# Patient Record
Sex: MALE | HISPANIC OR LATINO | Employment: OTHER | URBAN - METROPOLITAN AREA
[De-identification: names, ages, dates, MRNs, and addresses within clinical notes are randomized per-mention and may not be internally consistent; named-entity substitution may affect disease eponyms.]

---

## 2022-11-10 ENCOUNTER — HOSPITAL ENCOUNTER (EMERGENCY)
Facility: HOSPITAL | Age: 62
Discharge: HOME | End: 2022-11-10
Attending: EMERGENCY MEDICINE

## 2022-11-10 VITALS
DIASTOLIC BLOOD PRESSURE: 90 MMHG | RESPIRATION RATE: 18 BRPM | TEMPERATURE: 98 F | OXYGEN SATURATION: 95 % | SYSTOLIC BLOOD PRESSURE: 136 MMHG | HEART RATE: 90 BPM | WEIGHT: 200 LBS

## 2022-11-10 DIAGNOSIS — F12.929 MARIJUANA INTOXICATION (HCC): Primary | ICD-10-CM

## 2022-11-10 LAB
ATRIAL RATE: 109 BPM
P AXIS: 53 DEGREES
PR INTERVAL: 142 MS
QRS AXIS: 48 DEGREES
QRSD INTERVAL: 82 MS
QT INTERVAL: 348 MS
QTC INTERVAL: 468 MS
T WAVE AXIS: 46 DEGREES
VENTRICULAR RATE: 109 BPM

## 2022-12-16 NOTE — ED PROVIDER NOTES
History  Chief Complaint   Patient presents with   • Recreational Drug Use     Per Pt " I had one cannabis gummies and I am feeling unbalance, shakiness, heart racing and dry mouth  This is my first time use "      Elizabeth Barth is a 63 y o  male with a past medical history of anxiety presenting today after ingestion of cannabis Gummies  Patient with feelings of shakiness and heart racing with dry mouth  Reports that this is his first time using the substance  Denies any chest pain, shortness of breath, lightheadedness, dizziness, nausea, vomiting, diarrhea, fevers, chills, numbness, tingling, weakness in the extremities  No further complaints at this time            None       Past Medical History:   Diagnosis Date   • Anxiety        History reviewed  No pertinent surgical history  History reviewed  No pertinent family history  I have reviewed and agree with the history as documented  E-Cigarette/Vaping   • E-Cigarette Use Never User      E-Cigarette/Vaping Substances   • Nicotine No    • THC Yes    • CBD No      Social History     Tobacco Use   • Smoking status: Former   • Smokeless tobacco: Never   Vaping Use   • Vaping Use: Never used   Substance Use Topics   • Alcohol use: Never   • Drug use: Yes     Comment: cannabis gummies       Review of Systems   Constitutional: Negative for chills and fever  HENT: Negative for ear pain and sore throat  Eyes: Negative for pain and visual disturbance  Respiratory: Negative for cough and shortness of breath  Cardiovascular: Positive for palpitations  Negative for chest pain  Gastrointestinal: Negative for abdominal pain and vomiting  Genitourinary: Negative for dysuria and hematuria  Musculoskeletal: Negative for arthralgias and back pain  Skin: Negative for color change and rash  Neurological: Negative for seizures and syncope  All other systems reviewed and are negative        Physical Exam  Physical Exam  Vitals and nursing note reviewed  Constitutional:       General: He is not in acute distress  Appearance: He is well-developed  HENT:      Head: Normocephalic and atraumatic  Eyes:      Conjunctiva/sclera: Conjunctivae normal    Cardiovascular:      Rate and Rhythm: Normal rate and regular rhythm  Heart sounds: No murmur heard  Pulmonary:      Effort: Pulmonary effort is normal  No respiratory distress  Breath sounds: Normal breath sounds  Abdominal:      Palpations: Abdomen is soft  Tenderness: There is no abdominal tenderness  Musculoskeletal:         General: No swelling  Cervical back: Neck supple  Skin:     General: Skin is warm and dry  Capillary Refill: Capillary refill takes less than 2 seconds  Neurological:      Mental Status: He is alert  Psychiatric:         Mood and Affect: Mood normal          Vital Signs  ED Triage Vitals   Temperature Pulse Respirations Blood Pressure SpO2   11/10/22 0332 11/10/22 0332 11/10/22 0332 11/10/22 0336 11/10/22 0332   98 °F (36 7 °C) (!) 112 20 143/82 94 %      Temp Source Heart Rate Source Patient Position - Orthostatic VS BP Location FiO2 (%)   11/10/22 0332 11/10/22 0332 11/10/22 0558 11/10/22 0558 --   Oral Monitor Sitting Right arm       Pain Score       11/10/22 0332       No Pain           Vitals:    11/10/22 0332 11/10/22 0336 11/10/22 0558 11/10/22 0740   BP:  143/82 136/90    Pulse: (!) 112  94 90   Patient Position - Orthostatic VS:   Sitting          Visual Acuity      ED Medications  Medications - No data to display    Diagnostic Studies  Results Reviewed     None                 No orders to display              Procedures  Procedures         ED Course  ED Course as of 12/16/22 0018   Thu Nov 10, 2022   0244 Patient is alert and oriented x 4  Clinically sober on examination                                  SBIRT 20yo+    Flowsheet Row Most Recent Value   SBIRT (23 yo +)    In order to provide better care to our patients, we are screening all of our patients for alcohol and drug use  Would it be okay to ask you these screening questions? Yes Filed at: 11/10/2022 7035   Initial Alcohol Screen: US AUDIT-C     1  How often do you have a drink containing alcohol? 0 Filed at: 11/10/2022 0427   2  How many drinks containing alcohol do you have on a typical day you are drinking? 0 Filed at: 11/10/2022 0427   3a  Male UNDER 65: How often do you have five or more drinks on one occasion? 0 Filed at: 11/10/2022 0427   3b  FEMALE Any Age, or MALE 65+: How often do you have 4 or more drinks on one occassion? 0 Filed at: 11/10/2022 0427   Audit-C Score 0 Filed at: 11/10/2022 0655   MERRILL: How many times in the past year have you    Used an illegal drug or used a prescription medication for non-medical reasons? Never Filed at: 11/10/2022 6405                    MDM  Number of Diagnoses or Management Options  Marijuana intoxication [facility]): new and requires workup  Diagnosis management comments: Patient feeling significantly improved upon discharge  Amount and/or Complexity of Data Reviewed  Clinical lab tests: ordered and reviewed  Review and summarize past medical records: yes    Risk of Complications, Morbidity, and/or Mortality  Presenting problems: low  Diagnostic procedures: low  Management options: low  General comments: Patient feeling significantly improved     Patient Progress  Patient progress: stable      Disposition  Final diagnoses:   Marijuana intoxication ([address] )     Time reflects when diagnosis was documented in both MDM as applicable and the Disposition within this note     Time User Action Codes Description Comment    11/10/2022  7:34 AM Precilla Stands Add [U54 [id number]] Marijuana intoxication [facility])       ED Disposition     ED Disposition   Discharge    Condition   Stable    Date/Time   Thu Nov 10, 2022 Mitch 5265 discharge to home/self care                 Follow-up Information     Follow up With Specialties Details Why Contact Info Additional Information    [address] [facility] Emergency Department Emergency Medicine Go to  If symptoms worsen 34 [facility] 109 [facility] Emergency Department, [address] [place] [address], [address] [place] [address]  Call today To schedule an appointment for follow-up [phone]             There are no discharge medications for this patient  No discharge procedures on file      PDMP Review     None          ED Provider  Electronically Signed by           Mary Grace Theodore PA-C  12/16/22 0021

## 2024-02-24 ENCOUNTER — OFFICE VISIT (OUTPATIENT)
Dept: URGENT CARE | Facility: CLINIC | Age: 64
End: 2024-02-24

## 2024-02-24 VITALS
HEART RATE: 96 BPM | OXYGEN SATURATION: 96 % | TEMPERATURE: 97.2 F | SYSTOLIC BLOOD PRESSURE: 126 MMHG | DIASTOLIC BLOOD PRESSURE: 90 MMHG | WEIGHT: 195.8 LBS | RESPIRATION RATE: 18 BRPM

## 2024-02-24 DIAGNOSIS — L73.9 FOLLICULITIS: Primary | ICD-10-CM

## 2024-02-24 PROCEDURE — 99213 OFFICE O/P EST LOW 20 MIN: CPT | Performed by: PHYSICIAN ASSISTANT

## 2024-02-24 RX ORDER — PANTOPRAZOLE SODIUM 40 MG/1
40 TABLET, DELAYED RELEASE ORAL DAILY
COMMUNITY
Start: 2023-12-08

## 2024-02-24 RX ORDER — ALPRAZOLAM 1 MG/1
1 TABLET ORAL
COMMUNITY
Start: 2023-12-08

## 2024-02-24 NOTE — PROGRESS NOTES
"[facility]        NAME: Yoni Robbins is a 63 y.o. male  : 1960    MRN: [id number]  DATE: 2024  TIME: 4:04 PM    Assessment and Plan   Folliculitis [L73.9]  1. Folliculitis  mupirocin (BACTROBAN) 2 % ointment            Patient Instructions   To apply mupirocin to the affected nare.    Follow up with PCP in 3-5 days.  Proceed to  ER if symptoms worsen.    Chief Complaint     Chief Complaint   Patient presents with    Nasal Pain     Claims small, painful lesion in inside R nare.  Has \"acne\" lesions on face and chest per pt. Nasal lesion appeared during the night and has had some drainage.         History of Present Illness       HPI  Is a 63-year-old male here complaining of a painful bump inside his nose since last night.  Patient notes when he blows his nose there is a little bit of blood.  He denies any active nosebleeds.  He denies fever, shortness of breath, chest pain, vomiting or diarrhea.  Review of Systems   Review of Systems   Constitutional:  Negative for fever.   HENT:  Negative for nosebleeds.    Respiratory:  Negative for shortness of breath.    Cardiovascular:  Negative for chest pain.         Current Medications       Current Outpatient Medications:     ALPRAZolam (XANAX) 1 mg tablet, Take 1 mg by mouth daily at bedtime as needed, Disp: , Rfl:     mupirocin (BACTROBAN) 2 % ointment, Apply topically 3 (three) times a day, Disp: 15 g, Rfl: 0    pantoprazole (PROTONIX) 40 mg tablet, Take 40 mg by mouth daily, Disp: , Rfl:     Current Allergies     Allergies as of 2024    (No Known Allergies)            The following portions of the patient's history were reviewed and updated as appropriate: allergies, current medications, past family history, past medical history, past social history, past surgical history and problem list.     Past Medical History:   Diagnosis Date    Anxiety        No past surgical history on file.    No family history on " file.      Medications have been verified.        Objective   /90 (BP Location: Left arm, Patient Position: Sitting, Cuff Size: Adult)   Pulse 96   Temp (!) 97.2 °F (36.2 °C) (Tympanic)   Resp 18   Wt 88.8 kg (195 lb 12.8 oz)   SpO2 96%        Physical Exam     Physical Exam  Vitals and nursing note reviewed.   Constitutional:       Appearance: Normal appearance.   HENT:      Nose:      Comments: There appears to be an infected hair follicle inside the right nare.  Cardiovascular:      Rate and Rhythm: Normal rate and regular rhythm.   Pulmonary:      Effort: Pulmonary effort is normal.      Breath sounds: Normal breath sounds.   Neurological:      Mental Status: He is alert.

## 2025-01-23 ENCOUNTER — OFFICE VISIT (OUTPATIENT)
Dept: URGENT CARE | Facility: CLINIC | Age: 65
End: 2025-01-23
Payer: COMMERCIAL

## 2025-01-23 VITALS
SYSTOLIC BLOOD PRESSURE: 136 MMHG | OXYGEN SATURATION: 95 % | HEART RATE: 100 BPM | TEMPERATURE: 99 F | WEIGHT: 197.2 LBS | RESPIRATION RATE: 19 BRPM | DIASTOLIC BLOOD PRESSURE: 90 MMHG

## 2025-01-23 DIAGNOSIS — K57.92 DIVERTICULITIS: Primary | ICD-10-CM

## 2025-01-23 PROCEDURE — 99213 OFFICE O/P EST LOW 20 MIN: CPT | Performed by: PHYSICIAN ASSISTANT

## 2025-01-23 RX ORDER — METRONIDAZOLE 500 MG/1
500 TABLET ORAL EVERY 8 HOURS SCHEDULED
Qty: 21 TABLET | Refills: 0 | Status: SHIPPED | OUTPATIENT
Start: 2025-01-23 | End: 2025-01-30

## 2025-01-23 RX ORDER — CIPROFLOXACIN 500 MG/1
500 TABLET, FILM COATED ORAL EVERY 12 HOURS SCHEDULED
Qty: 14 TABLET | Refills: 0 | Status: SHIPPED | OUTPATIENT
Start: 2025-01-23 | End: 2025-01-30

## 2025-01-23 NOTE — PATIENT INSTRUCTIONS
Diverticulitis  Metronidazole as directed  Cipro twice daily x 7 days  Follow up with PCP in 3-5 days.  Proceed to  ER if symptoms worsen.Patient Education     Instrucciones para el sánchez después de diverticulitis   Acerca de amanda rose   Algunas veces, es posible que se le formen pequeñas bolsas o sacos en las morelos del intestino grueso. [place] se llama diverticulosis. Las bolsas se pueden inflamar o infectar. A esto se le llama diverticulitis. Tiene más posibilidades de tener amanda problema si tiene entre 60 y 80 años o si tiene estreñimiento crónico.     ¿Qué cuidados se necesitan en casa?   Pregunte al médico qué debe hacer al llegar a casa. Asegúrese de hacer preguntas si no entiende lo que explica el médico. Así, sabrá qué debe hacer.  [place] los medicamentos según se lo indique el médico.  Siga abdulaziz dieta equilibrada.  No se demore cuando quiera ir de vientre. Hágalo apenas sienta la necesidad.  No fuerce dong evacuaciones.  Antonia entre 8 y 10 vasos de agua por día. Hable con gates médico si dioni menos líquido debido a un problema de sushil.  ¿Qué cuidados se necesitan en la etapa de seguimiento?   Es posible que el médico le solicite que visite el consultorio para evaluar gates progreso. Asegúrese de asistir.  ¿Qué medicamentos pueden ser necesarios?   Es posible que el médico le recete medicamentos para conseguir lo siguiente:  Aliviar el dolor y la inflamación  Combatir abdulaziz infección  Ablandar las heces para ayudarlo a prevenir esfuerzos cuando va de vientre.  ¿Estará restringida la actividad física?   Cuando tiene dolor, quizás deba descansar en la cama. Para aliviar el dolor, utilice abdulaziz compresa térmica en el abdomen. [place] debería durar solo unos días.  ¿Qué cambios en la dieta son necesarios?   Hable con el médico acerca de los cambios que debe hacer en gates dieta. Cuando las bolsas se inflaman o infectan, es posible que deba consumir solo líquidos piedad unos días. [place] puede aliviar el dolor y ayudarlo a  sanar.  Agregue más fibra a la dieta poco a poco cuando se sienta mejor.  No necesita evitar las semillas, frutas secas (nueces, almendras, etc.), maíz o demás alimentos semejantes.  Necesitará comer alimentos con alto contenido de fibras y beber más agua.  Coma 5 o más porciones de frutas frescas y verduras todos los días.  Coma 6 o más porciones de panes integrales y cereales.  Intente ingerir de 25 a 30 gramos de fibras todos los días. Swetha las etiquetas para aprender cuánta fibra tienen los alimentos.  Evite el consumo de cerveza, vino y bebidas mixtas (alcohol).  ¿Qué problemas podrían surgir?   Las bolsas o sacos en el intestino se pueden infectar o llenar de pus.  Orificio o desgarro en el intestino  Estrechamiento de abdulaziz parte del intestino  La cirugía puede ser necesaria para drenar abdulaziz infección o absceso.  Si no se lo trata, el colon podría romperse y la cirugía puede ser necesaria.  ¿Cómo puede prevenirse amanda problema de sushil?   La mejor manera de evitar la diverticulosis es mantener las evacuaciones normales y que las heces martha blandas. Para evitar la formación de más bolsas realice lo siguiente:  Coma más granos enteros, verduras y frutas. Intente ingerir de 25 a 30 gramos de fibras todos los días. Swetha las etiquetas para aprender cuánta fibra tienen los alimentos.  Antonia más agua. [place] deion vasos de 8 onzas (240 ml) al día.  Sea activo. [place], trabaje en el jardín o realice alguna actividad piedad 30 minutos o más la mayoría de los días de la semana.  Hable con el médico acerca de añadir un producto de fibra de venta stephanie para mantener las heces blandas.  El uso en exceso de algunos medicamentos para el dolor puede causar estreñimiento; hable con gates médico.  Si tiene muchas bolsas en el intestino grueso, es posible que la cirugía sea lo mejor para usted. Hable con el médico sobre esto.  ¿Cuándo ayah llamar al médico?   Signos de infección. Estos incluyen fiebre de 100,4 °F (38 °C) o más sánchez y  escalofríos.  Náuseas, vómitos o dolor abdominal muy intenso.  Vomitar o no poder comer, beber o elham los medicamentos.  Fatiga extrema, aturdimiento, mareos o desmayos.  Diarrea o steven en las heces.  No realizar evacuaciones ni eliminar gases.  Repita la enseñanza con dong propias palabras (Teach Back): Ayudándolo a comprender   El método de enseñanza recíproca ayuda a comprender la información que se le está proporcionando. Después de hablar con el personal, cuente con dong propias palabras lo que acaba de aprender. [place] le asegura al personal que se kaufman cubierto todos los aspectos necesarios de forma doris. También ayuda a explicar ciertas cosas que pueden xochitl sido un poco confusas. Antes de irse a gates casa, asegúrese de poder hacer lo siguiente:  Hablar sobre la afección.  Decir cuáles son los cambios que hay que realizar con respecto a la dieta o los medicamentos.  Decir qué haré en bren de tener dolor abdominal muy intenso, estreñimiento o heces con steven.  ¿Dónde puedo obtener más información?   International Foundation for Functional Gastrointestinal Disorders  http://www.iffgd.org/site/gi-disorders/other/diverticulosis   Exención de responsabilidad y uso de la información del consumidor   Esta información general es un resumen limitado de la información sobre el diagnóstico, el tratamiento y/o la medicación. No pretende ser exhaustivo y debe utilizarse anabell abdulaziz herramienta para ayudar al usuario a comprender y/o evaluar las posibles opciones de diagnóstico y tratamiento. NO incluye toda la información sobre las enfermedades, los tratamientos, los medicamentos, los efectos secundarios o los riesgos que pueden aplicarse a un paciente específico. No tiene por objeto ser un consejo médico ni un sustituto del consejo médico. [place] pretende reemplazar al diagnóstico o el tratamiento proporcionados por un proveedor de atención médica con base en el examen y la evaluación por parte de amanda proveedor de las  circunstancias específicas y únicas de un paciente. Los pacientes deben hablar con un proveedor de atención médica para obtener información completa sobre gates sushil, preguntas médicas y opciones de tratamiento, incluidos los riesgos o beneficios relacionados con el uso de medicamentos. Esta información no respalda ningún tratamiento o medicamento anabell seguro, eficaz o aprobado para tratar a un paciente específico. [employer] y dong afiliados renuncian a cualquier garantía o responsabilidad relacionada con esta información o con el uso que se kane de esta. El uso de esta información se rige por las Condiciones de uso, disponibles en https://www.wolterskluwer.com/en/know/clinical-effectiveness-terms   Copyright   Copyright © 2024 [employer] y dong licenciantes y/o afiliados. Todos los derechos reservados.

## 2025-01-23 NOTE — PROGRESS NOTES
[facility]        NAME: Yoni oRbbins is a 64 y.o. male  : 1960    MRN: [id number]  DATE: 2025  TIME: 3:34 PM    Assessment and Plan   Diverticulitis [K57.92]  1. Diverticulitis  ciprofloxacin (CIPRO) 500 mg tablet    metroNIDAZOLE (FLAGYL) 500 mg tablet            Patient Instructions     Diverticulitis  Metronidazole as directed  Cipro twice daily x 7 days  Follow up with PCP in 3-5 days.  Proceed to  ER if symptoms worsen.    Chief Complaint     Chief Complaint   Patient presents with    Abdominal Pain     Pt c/o eating a caesar salad and having stomach pains. Pt has a stomach condition he forgets the name to and was prescribed medications that helped.   Started 2 days ago.          History of Present Illness       65 y/o male with past medical history of diverticulitis presents c/o left lower abdominal pain. States he had similar symptoms in the past with previous episodes of diverticulitis which were treated with antibiotics. Denies fever, chills, nausea, vomiting    Abdominal Pain        Review of Systems   Review of Systems   Gastrointestinal:  Positive for abdominal pain.         Current Medications       Current Outpatient Medications:     ciprofloxacin (CIPRO) 500 mg tablet, Take 1 tablet (500 mg total) by mouth every 12 (twelve) hours for 7 days, Disp: 14 tablet, Rfl: 0    metroNIDAZOLE (FLAGYL) 500 mg tablet, Take 1 tablet (500 mg total) by mouth every 8 (eight) hours for 7 days, Disp: 21 tablet, Rfl: 0    ALPRAZolam (XANAX) 1 mg tablet, Take 1 mg by mouth daily at bedtime as needed (Patient not taking: Reported on 2025), Disp: , Rfl:     mupirocin (BACTROBAN) 2 % ointment, Apply topically 3 (three) times a day (Patient not taking: Reported on 2025), Disp: 15 g, Rfl: 0    pantoprazole (PROTONIX) 40 mg tablet, Take 40 mg by mouth daily (Patient not taking: Reported on 2025), Disp: , Rfl:     Current Allergies     Allergies as of 2025    (No  Known Allergies)            The following portions of the patient's history were reviewed and updated as appropriate: allergies, current medications, past family history, past medical history, past social history, past surgical history and problem list.     Past Medical History:   Diagnosis Date    Anxiety        History reviewed. No pertinent surgical history.    History reviewed. No pertinent family history.      Medications have been verified.        Objective   /90   Pulse 100   Temp 99 °F (37.2 °C)   Resp 19   Wt 89.4 kg (197 lb 3.2 oz)   SpO2 95%        Physical Exam     Physical Exam  Constitutional:       General: He is not in acute distress.     Appearance: He is well-developed. He is not diaphoretic.   Cardiovascular:      Rate and Rhythm: Normal rate and regular rhythm.      Heart sounds: Normal heart sounds.   Pulmonary:      Effort: Pulmonary effort is normal. No respiratory distress.      Breath sounds: Normal breath sounds. No wheezing or rales.   Chest:      Chest wall: No tenderness.   Abdominal:      General: Abdomen is flat. Bowel sounds are normal.      Palpations: Abdomen is soft.      Tenderness: There is abdominal tenderness in the left lower quadrant. There is no right CVA tenderness, left CVA tenderness, guarding or rebound. Negative signs include Pierre's sign, McBurney's sign and psoas sign.   Musculoskeletal:      Cervical back: Normal range of motion and neck supple.   Lymphadenopathy:      Cervical: No cervical adenopathy.

## 2025-03-26 ENCOUNTER — HOSPITAL ENCOUNTER (OUTPATIENT)
Dept: CT IMAGING | Facility: CLINIC | Age: 65
Discharge: HOME | End: 2025-03-26
Payer: COMMERCIAL

## 2025-03-26 DIAGNOSIS — R10.9 ACUTE ABDOMINAL PAIN: ICD-10-CM

## 2025-03-26 PROCEDURE — 74177 CT ABD & PELVIS W/CONTRAST: CPT

## 2025-03-26 RX ADMIN — IOHEXOL 75 ML: 350 INJECTION, SOLUTION INTRAVENOUS at 12:07
